# Patient Record
Sex: FEMALE | URBAN - METROPOLITAN AREA
[De-identification: names, ages, dates, MRNs, and addresses within clinical notes are randomized per-mention and may not be internally consistent; named-entity substitution may affect disease eponyms.]

---

## 2018-04-09 ENCOUNTER — NURSE TRIAGE (OUTPATIENT)
Dept: NURSING | Facility: CLINIC | Age: 18
End: 2018-04-09

## 2018-04-09 NOTE — TELEPHONE ENCOUNTER
Caller states she had a prescription at  [facility] pharmacy and they closed before she could pick it up;inquiring if there  was  any way to have it transferred  Advised that  [facility] does not have access to  [facility] records and are unable to mckayla do this  Advised to be seen in UC or ED if she needs RX urgently  Understands  Lashonda Pink RN  [facility]    Additional Information    General information question, no triage required and triager able to answer question    Protocols used: INFORMATION ONLY CALL-ADULT-